# Patient Record
Sex: FEMALE | Race: WHITE
[De-identification: names, ages, dates, MRNs, and addresses within clinical notes are randomized per-mention and may not be internally consistent; named-entity substitution may affect disease eponyms.]

---

## 2019-06-22 ENCOUNTER — HOSPITAL ENCOUNTER (EMERGENCY)
Dept: HOSPITAL 62 - ER | Age: 24
Discharge: HOME | End: 2019-06-22
Payer: MEDICAID

## 2019-06-22 VITALS — SYSTOLIC BLOOD PRESSURE: 116 MMHG | DIASTOLIC BLOOD PRESSURE: 56 MMHG

## 2019-06-22 DIAGNOSIS — F17.200: ICD-10-CM

## 2019-06-22 DIAGNOSIS — K52.9: Primary | ICD-10-CM

## 2019-06-22 DIAGNOSIS — R11.2: ICD-10-CM

## 2019-06-22 DIAGNOSIS — R10.9: ICD-10-CM

## 2019-06-22 DIAGNOSIS — D72.829: ICD-10-CM

## 2019-06-22 DIAGNOSIS — R61: ICD-10-CM

## 2019-06-22 DIAGNOSIS — R51: ICD-10-CM

## 2019-06-22 LAB
ABSOLUTE LYMPHOCYTES# (MANUAL): 3.3 10^3/UL (ref 0.5–4.7)
ABSOLUTE MONOCYTES # (MANUAL): 2 10^3/UL (ref 0.1–1.4)
ADD MANUAL DIFF: YES
ALBUMIN SERPL-MCNC: 5.1 G/DL (ref 3.5–5)
ALP SERPL-CCNC: 54 U/L (ref 38–126)
ALT SERPL-CCNC: 26 U/L (ref 9–52)
ANION GAP SERPL CALC-SCNC: 11 MMOL/L (ref 5–19)
APPEARANCE UR: (no result)
APTT PPP: (no result) S
AST SERPL-CCNC: 24 U/L (ref 14–36)
BASOPHILS NFR BLD MANUAL: 0 % (ref 0–2)
BILIRUB DIRECT SERPL-MCNC: 0.2 MG/DL (ref 0–0.4)
BILIRUB SERPL-MCNC: 0.6 MG/DL (ref 0.2–1.3)
BILIRUB UR QL STRIP: NEGATIVE
BUN SERPL-MCNC: 24 MG/DL (ref 7–20)
CALCIUM: 10.4 MG/DL (ref 8.4–10.2)
CHLORIDE SERPL-SCNC: 107 MMOL/L (ref 98–107)
CO2 SERPL-SCNC: 26 MMOL/L (ref 22–30)
EOSINOPHIL NFR BLD MANUAL: 2 % (ref 0–6)
ERYTHROCYTE [DISTWIDTH] IN BLOOD BY AUTOMATED COUNT: 12.6 % (ref 11.5–14)
GLUCOSE SERPL-MCNC: 113 MG/DL (ref 75–110)
GLUCOSE UR STRIP-MCNC: NEGATIVE MG/DL
HCT VFR BLD CALC: 42.6 % (ref 36–47)
HGB BLD-MCNC: 14.3 G/DL (ref 12–15.5)
KETONES UR STRIP-MCNC: 20 MG/DL
LIPASE SERPL-CCNC: 59 U/L (ref 23–300)
MCH RBC QN AUTO: 30.4 PG (ref 27–33.4)
MCHC RBC AUTO-ENTMCNC: 33.7 G/DL (ref 32–36)
MCV RBC AUTO: 90 FL (ref 80–97)
MONOCYTES % (MANUAL): 9 % (ref 3–13)
NITRITE UR QL STRIP: NEGATIVE
PH UR STRIP: 5 [PH] (ref 5–9)
PLATELET # BLD: 296 10^3/UL (ref 150–450)
PLATELET CLUMP BLD QL SMEAR: PRESENT
PLATELET COMMENT: ADEQUATE
POTASSIUM SERPL-SCNC: 4.9 MMOL/L (ref 3.6–5)
PROT SERPL-MCNC: 8.4 G/DL (ref 6.3–8.2)
PROT UR STRIP-MCNC: 100 MG/DL
RBC # BLD AUTO: 4.72 10^6/UL (ref 3.72–5.28)
SEGMENTED NEUTROPHILS % (MAN): 74 % (ref 42–78)
SODIUM SERPL-SCNC: 143.7 MMOL/L (ref 137–145)
SP GR UR STRIP: 1.03
TOTAL CELLS COUNTED BLD: 100
TOXIC GRANULES BLD QL SMEAR: SLIGHT
UROBILINOGEN UR-MCNC: NEGATIVE MG/DL (ref ?–2)
VARIANT LYMPHS NFR BLD MANUAL: 15 % (ref 13–45)
WBC # BLD AUTO: 22.1 10^3/UL (ref 4–10.5)

## 2019-06-22 PROCEDURE — 96361 HYDRATE IV INFUSION ADD-ON: CPT

## 2019-06-22 PROCEDURE — 80053 COMPREHEN METABOLIC PANEL: CPT

## 2019-06-22 PROCEDURE — 85025 COMPLETE CBC W/AUTO DIFF WBC: CPT

## 2019-06-22 PROCEDURE — 81001 URINALYSIS AUTO W/SCOPE: CPT

## 2019-06-22 PROCEDURE — 99284 EMERGENCY DEPT VISIT MOD MDM: CPT

## 2019-06-22 PROCEDURE — 82962 GLUCOSE BLOOD TEST: CPT

## 2019-06-22 PROCEDURE — 81025 URINE PREGNANCY TEST: CPT

## 2019-06-22 PROCEDURE — 96374 THER/PROPH/DIAG INJ IV PUSH: CPT

## 2019-06-22 PROCEDURE — 36415 COLL VENOUS BLD VENIPUNCTURE: CPT

## 2019-06-22 PROCEDURE — 83690 ASSAY OF LIPASE: CPT

## 2019-06-22 NOTE — ER DOCUMENT REPORT
ED GI/





- General


Chief Complaint: Vomiting


Stated Complaint: VOMITING


Time Seen by Provider: 06/22/19 14:42


Notes: 





24-year-old female presents to the emergency department for multiple episodes of

vomiting.  Patient states initially she thought it was something she ate.  

States she had multiple episodes of vomiting days ago.  States that again today 

she continues with vomiting.  States she is unable to keep anything down, has 

been sweating a lot, and uncontrollably shaking.  Patient is denying any history

of diabetes for herself but does state that her mother has diabetes. Patient 

denies any diarrhea or actual abdominal pain.  States intermittent cramping 

feeling in her belly she states is "from the muscles because I keep vomiting."  





TRAVEL OUTSIDE OF THE [place] IN LAST 30 DAYS: No





Past Medical History





- Social History


Smoking Status: Current Every Day Smoker


Frequency of alcohol use: None


Drug Abuse: None


Family History: None


Patient has suicidal ideation: No


Patient has homicidal ideation: No


Renal/ Medical History: Denies: Hx Peritoneal Dialysis





Review of Systems





- Review of Systems


Constitutional: denies: Chills, Fever


EENT: denies: Throat pain, Throat swelling


Respiratory: denies: Cough


Gastrointestinal: Diarrhea, Nausea, Vomiting.  denies: Abdomen distended, 

Abdominal pain, Constipation, Black stools, Rectal bleeding


Musculoskeletal: denies: Back pain, Muscle pain


Neurological/Psychological: denies: Headaches, Numbness


-: Yes All other systems reviewed and negative





Physical Exam





- Vital signs


Vitals: 


                                        











Temp Pulse Resp BP Pulse Ox


 


 97.6 F   72   40 H  139/91 H  100 


 


 06/22/19 14:25  06/22/19 14:25  06/22/19 14:25  06/22/19 14:25  06/22/19 14:25














- Notes


Notes: 





GENERAL_APPEARANCE: well_nourished, alert, cooperative


 VITALS: reviewed, see vital signs table.


 HEAD: no_swelling\tenderness on the head.


 EYES: PERRL, EOMI, conjunctiva_clear.


 NOSE: no_nasal_discharge.


 MOUTH: Dry tongue and mouth


 THROAT: no_tonsilar_inflammation, no_airway_obstruction. no_lymphadenopathy


 NECK: supple, no_neck_tenderness, (-)thyromegaly.


 BACK: no_back_tenderness.


 CHEST_WALL: no_chest_tenderness.


 LUNGS: no_wheezing, no_rales, no_rhonchi, (-)accessory muscle use, good air 

exchange bilateral.


 HEART: normal_rate, normal_rhythm, normal_S1, normal_S2, (-)S3, (-)S4, 

no_murmur, no_rub.


 ABDOMEN: normal_BS, soft, no_abd_tenderness, (-)guarding, (-)rebound, 

no_organomegaly, no_abd_masses.


 EXTREMITIES: good pulses in all_extremities, no_swelling\tenderness in the 

extremities, no_edema.


 SKIN: warm, dry, good_color, no_rash.


 MENTAL_STATUS: speech_clear, oriented_X_3, normal_affect, 

responds_appropriately to questions.


 NEURO: Neg Motor or Sensory Deficits on exam, CN 2-12 intact, DTR 2+ symmetric 

x 4, No cerbellar signs

















Course





- Re-evaluation


Re-evalutation: 





06/22/19 15:34


24-year-old female comes in with nausea vomiting and diarrhea started suddenly 

about 1 PM.  She is vomited multiple times watery emesis.  She states she had 

several loose stools.  She comes here to the ER and was seen and was given 

Zofran upfront.  Patient now is asking for a lot of something to eat and drink 

states she is feeling better she denies any abdominal pain.  Her abdominal exam 

is soft and supple is no tenderness McBurney's point no rebound or guarding is 

noted.  Allow the patient to finish her fluids.  We will discharge her home with

Zofran.


Patient complained of a lot of headaches on and off describes as dull and her 

entire headache is a sugar blurred vision on and off.  Denies any extremity 

numbness tingling weakness.  CT scan to better evaluate there is no thunderclap 

no rapid onset.  Not worse headache of life.  She thinks are related to 

hormones.  She has had hormone related headaches before





06/22/19 16:49


Patient is feeling better fluids are going slow however they are going.  The 

patient does not have any focal tenderness in her abdomen her abdomen is soft an

d supple.  She does have leukocytosis but this may all just be due to viral 

illness and acute phase reactant due to all the vomiting.  She is feeling better

with the fluids and Zofran.  There is no tenderness McBurney's point.  The 

patient is feeling much better we will give her a liter of IV fluids and 

prescription for Zofran for home there for whatever reason she starts feeling 

poorly in the next 24 hours again or develops focal pain as we have discussed 

she will come back and get reevaluated possibly a CT at this time with no focal 

tenderness or pain and a benign abdominal exam I do not believe she requires 

emergent imaging.  Patient is comfortable with this plan and understands my 

instructions.





- Vital Signs


Vital signs: 


                                        











Temp Pulse Resp BP Pulse Ox


 


 97.6 F   72   40 H  139/91 H  100 


 


 06/22/19 14:25  06/22/19 14:25  06/22/19 14:25  06/22/19 14:25  06/22/19 14:25














- Laboratory


Result Diagrams: 


                                 06/22/19 15:06





                                 06/22/19 15:06


Laboratory results interpreted by me: 


                                        











  06/22/19 06/22/19 06/22/19





  14:41 15:06 15:06


 


WBC   22.1 H 


 


Abs Neuts (Manual)   16.4 H 


 


Abs Monocytes (Manual)   2.0 H 


 


BUN    24 H


 


Glucose    113 H


 


POC Glucose  114 H  


 


Calcium    10.4 H


 


Total Protein    8.4 H


 


Albumin    5.1 H


 


Urine Protein   


 


Urine Ketones   


 


Urine Blood   


 


Ur Leukocyte Esterase   














  06/22/19





  15:20


 


WBC 


 


Abs Neuts (Manual) 


 


Abs Monocytes (Manual) 


 


BUN 


 


Glucose 


 


POC Glucose 


 


Calcium 


 


Total Protein 


 


Albumin 


 


Urine Protein  100 H


 


Urine Ketones  20 H


 


Urine Blood  SMALL H


 


Ur Leukocyte Esterase  TRACE H














Discharge





- Discharge


Clinical Impression: 


 Gastroenteritis





Condition: Good


Disposition: HOME, SELF-CARE


Instructions:  Antinausea Medication (H), Gastroenteritis (adult) ([facility])


Prescriptions: 


Ondansetron [Zofran Odt 4 mg Tablet] 1 - 2 tab PO Q4HP PRN #10 tab.rapdis


 PRN Reason:

## 2019-06-22 NOTE — ER DOCUMENT REPORT
ED Medical Screen (RME)





- General


Chief Complaint: Vomiting


Stated Complaint: VOMITING


Notes: 





Patient is otherwise healthy 24-year-old female presents to the emergency 

department for multiple episodes of vomiting.  Patient states initially she 

thought it was something she ate.  States she had multiple episodes of vomiting 

days ago.  States that again today she continues with vomiting.  States she is 

unable to keep anything down, has been sweating a lot, and uncontrollably 

shaking.  Patient is denying any history of diabetes for herself but does state 

that her mother has diabetes.


Patient denies any diarrhea or actual abdominal pain.  States intermittent 

cramping feeling in her belly she states is "from the muscles because I keep 

vomiting."  








GENERAL: Alert, pale and diaphoretic


ABDOMEN: Soft, non-tender. Non-distended. Bowel sounds present in all 4 quadra

nts.








I have greeted and performed a rapid initial assessment of this patient.  A 

comprehensive ED assessment and evaluation of the patient, analysis of test 

results and completion of the medical decision making process will be conducted 

by additional ED providers.





I have specifically instructed the patient or family members with the patient to

immediately return to any nursing staff should anything change in the patient's 

condition or with their chief complaint.








This medical record was dictated with voice recognizing software.  There may be 

grammatical, syntax errors that are unintended.














TRAVEL OUTSIDE OF THE [place] IN LAST 30 DAYS: No





Physical Exam





- Vital signs


Vitals: 





                                        











Temp Pulse Resp BP Pulse Ox


 


 97.6 F   72   40 H  139/91 H  100 


 


 06/22/19 14:25  06/22/19 14:25  06/22/19 14:25  06/22/19 14:25  06/22/19 14:25














Course





- Vital Signs


Vital signs: 





                                        











Temp Pulse Resp BP Pulse Ox


 


 97.6 F   72   40 H  139/91 H  100 


 


 06/22/19 14:25  06/22/19 14:25  06/22/19 14:25  06/22/19 14:25  06/22/19 14:25